# Patient Record
Sex: FEMALE | Race: WHITE | NOT HISPANIC OR LATINO | Employment: OTHER
[De-identification: names, ages, dates, MRNs, and addresses within clinical notes are randomized per-mention and may not be internally consistent; named-entity substitution may affect disease eponyms.]

---

## 2017-07-31 ENCOUNTER — HOSPITAL (OUTPATIENT)
Dept: OTHER | Age: 72
End: 2017-07-31

## 2017-07-31 ENCOUNTER — IMAGING SERVICES (OUTPATIENT)
Dept: OTHER | Age: 72
End: 2017-07-31

## 2017-08-01 ENCOUNTER — HOSPITAL (OUTPATIENT)
Dept: OTHER | Age: 72
End: 2017-08-01

## 2017-08-01 ENCOUNTER — IMAGING SERVICES (OUTPATIENT)
Dept: OTHER | Age: 72
End: 2017-08-01

## 2017-08-16 ENCOUNTER — HOSPITAL (OUTPATIENT)
Dept: OTHER | Age: 72
End: 2017-08-16

## 2017-11-25 ENCOUNTER — HOSPITAL (OUTPATIENT)
Dept: OTHER | Age: 72
End: 2017-11-25

## 2017-11-25 ENCOUNTER — IMAGING SERVICES (OUTPATIENT)
Dept: OTHER | Age: 72
End: 2017-11-25

## 2017-11-28 ENCOUNTER — LAB SERVICES (OUTPATIENT)
Dept: OTHER | Age: 72
End: 2017-11-28

## 2017-11-28 ENCOUNTER — CHARTING TRANS (OUTPATIENT)
Dept: OTHER | Age: 72
End: 2017-11-28

## 2017-11-28 LAB
APPEARANCE: CLEAR
BILIRUBIN: NEGATIVE
COLOR: YELLOW
GLUCOSE U: NEGATIVE
KETONES: NEGATIVE
LEUKOCYTE ESTERASE: NEGATIVE
NITRITE: NEGATIVE
OCCULT BLOOD: NEGATIVE
PH: 7
PROTEIN: NEGATIVE
URINE SPEC GRAVITY: 1.01
UROBILINOGEN: 0.2

## 2017-12-23 ENCOUNTER — IMAGING SERVICES (OUTPATIENT)
Dept: OTHER | Age: 72
End: 2017-12-23

## 2017-12-23 ENCOUNTER — HOSPITAL (OUTPATIENT)
Dept: OTHER | Age: 72
End: 2017-12-23

## 2018-07-16 ENCOUNTER — CHARTING TRANS (OUTPATIENT)
Dept: OTHER | Age: 73
End: 2018-07-16

## 2018-07-17 ENCOUNTER — MYAURORA ACCOUNT LINK (OUTPATIENT)
Dept: OTHER | Age: 73
End: 2018-07-17

## 2018-07-17 ENCOUNTER — CHARTING TRANS (OUTPATIENT)
Dept: OTHER | Age: 73
End: 2018-07-17

## 2018-08-22 ENCOUNTER — CHARTING TRANS (OUTPATIENT)
Dept: OTHER | Age: 73
End: 2018-08-22

## 2018-09-25 ENCOUNTER — IMAGING SERVICES (OUTPATIENT)
Dept: OTHER | Age: 73
End: 2018-09-25

## 2018-09-25 ENCOUNTER — HOSPITAL (OUTPATIENT)
Dept: OTHER | Age: 73
End: 2018-09-25

## 2018-09-27 ENCOUNTER — MYAURORA ACCOUNT LINK (OUTPATIENT)
Dept: OTHER | Age: 73
End: 2018-09-27

## 2018-09-27 ENCOUNTER — CHARTING TRANS (OUTPATIENT)
Dept: OTHER | Age: 73
End: 2018-09-27

## 2018-10-31 VITALS
HEART RATE: 80 BPM | BODY MASS INDEX: 29.64 KG/M2 | HEIGHT: 61 IN | WEIGHT: 156.99 LBS | TEMPERATURE: 98.1 F | SYSTOLIC BLOOD PRESSURE: 160 MMHG | DIASTOLIC BLOOD PRESSURE: 82 MMHG

## 2018-11-02 VITALS
DIASTOLIC BLOOD PRESSURE: 72 MMHG | WEIGHT: 163.38 LBS | SYSTOLIC BLOOD PRESSURE: 118 MMHG | HEIGHT: 61 IN | HEART RATE: 76 BPM | BODY MASS INDEX: 30.85 KG/M2

## 2018-12-12 ENCOUNTER — HOSPITAL (OUTPATIENT)
Dept: OTHER | Age: 73
End: 2018-12-12

## 2019-01-01 ENCOUNTER — EXTERNAL RECORD (OUTPATIENT)
Dept: HEALTH INFORMATION MANAGEMENT | Facility: OTHER | Age: 74
End: 2019-01-01

## 2019-01-10 ENCOUNTER — HOSPITAL (OUTPATIENT)
Dept: OTHER | Age: 74
End: 2019-01-10

## 2019-01-14 ENCOUNTER — HOSPITAL (OUTPATIENT)
Dept: OTHER | Age: 74
End: 2019-01-14

## 2020-01-10 ENCOUNTER — OFFICE VISIT (OUTPATIENT)
Dept: OBGYN | Age: 75
End: 2020-01-10

## 2020-01-10 VITALS
DIASTOLIC BLOOD PRESSURE: 74 MMHG | WEIGHT: 160 LBS | SYSTOLIC BLOOD PRESSURE: 128 MMHG | HEIGHT: 61 IN | BODY MASS INDEX: 30.21 KG/M2

## 2020-01-10 DIAGNOSIS — B37.2 CANDIDAL INTERTRIGO: ICD-10-CM

## 2020-01-10 DIAGNOSIS — M81.6 LOCALIZED OSTEOPOROSIS WITHOUT CURRENT PATHOLOGICAL FRACTURE: ICD-10-CM

## 2020-01-10 DIAGNOSIS — Z79.83 LONG TERM CURRENT USE OF BISPHOSPHONATES: ICD-10-CM

## 2020-01-10 DIAGNOSIS — Z12.31 ENCOUNTER FOR SCREENING MAMMOGRAM FOR MALIGNANT NEOPLASM OF BREAST: Primary | ICD-10-CM

## 2020-01-10 DIAGNOSIS — N95.9 MENOPAUSAL AND POSTMENOPAUSAL DISORDER: ICD-10-CM

## 2020-01-10 PROCEDURE — G0101 CA SCREEN;PELVIC/BREAST EXAM: HCPCS | Performed by: OBSTETRICS & GYNECOLOGY

## 2020-01-10 RX ORDER — CLOTRIMAZOLE AND BETAMETHASONE DIPROPIONATE 10; .64 MG/G; MG/G
CREAM TOPICAL 2 TIMES DAILY
Qty: 1 TUBE | Refills: 3 | Status: SHIPPED | OUTPATIENT
Start: 2020-01-10 | End: 2020-01-24

## 2020-01-10 ASSESSMENT — ENCOUNTER SYMPTOMS
APPETITE CHANGE: 0
ACTIVITY CHANGE: 1
HEADACHES: 1
UNEXPECTED WEIGHT CHANGE: 1
BACK PAIN: 1
FATIGUE: 0
RESPIRATORY NEGATIVE: 1

## 2020-01-10 ASSESSMENT — PATIENT HEALTH QUESTIONNAIRE - PHQ9
2. FEELING DOWN, DEPRESSED OR HOPELESS: NOT AT ALL
SUM OF ALL RESPONSES TO PHQ9 QUESTIONS 1 AND 2: 0
1. LITTLE INTEREST OR PLEASURE IN DOING THINGS: NOT AT ALL
SUM OF ALL RESPONSES TO PHQ9 QUESTIONS 1 AND 2: 0

## 2020-06-05 ENCOUNTER — EXTERNAL RECORD (OUTPATIENT)
Dept: HEALTH INFORMATION MANAGEMENT | Facility: OTHER | Age: 75
End: 2020-06-05

## 2021-01-13 DIAGNOSIS — Z12.31 VISIT FOR SCREENING MAMMOGRAM: Primary | ICD-10-CM

## 2021-02-10 ENCOUNTER — IMAGING SERVICES (OUTPATIENT)
Dept: MAMMOGRAPHY | Age: 76
End: 2021-02-10
Attending: FAMILY MEDICINE

## 2021-02-10 DIAGNOSIS — Z12.31 VISIT FOR SCREENING MAMMOGRAM: ICD-10-CM

## 2021-02-10 PROCEDURE — 77067 SCR MAMMO BI INCL CAD: CPT | Performed by: RADIOLOGY

## 2021-02-10 PROCEDURE — 77063 BREAST TOMOSYNTHESIS BI: CPT | Performed by: RADIOLOGY

## 2021-03-27 ENCOUNTER — EXTERNAL RECORD (OUTPATIENT)
Dept: HEALTH INFORMATION MANAGEMENT | Facility: OTHER | Age: 76
End: 2021-03-27

## 2021-10-26 ENCOUNTER — IMAGING SERVICES (OUTPATIENT)
Dept: GENERAL RADIOLOGY | Age: 76
End: 2021-10-26
Attending: FAMILY MEDICINE

## 2021-10-26 DIAGNOSIS — K59.03 DRUG INDUCED CONSTIPATION: ICD-10-CM

## 2021-10-26 DIAGNOSIS — R10.30 LOWER ABDOMINAL PAIN: ICD-10-CM

## 2021-10-26 PROCEDURE — 74019 RADEX ABDOMEN 2 VIEWS: CPT | Performed by: RADIOLOGY

## 2021-11-11 ENCOUNTER — EXTERNAL RECORD (OUTPATIENT)
Dept: HEALTH INFORMATION MANAGEMENT | Facility: OTHER | Age: 76
End: 2021-11-11

## 2022-01-14 ENCOUNTER — OFFICE VISIT (OUTPATIENT)
Dept: OBGYN | Age: 77
End: 2022-01-14

## 2022-01-14 VITALS
SYSTOLIC BLOOD PRESSURE: 147 MMHG | BODY MASS INDEX: 26.84 KG/M2 | DIASTOLIC BLOOD PRESSURE: 80 MMHG | HEART RATE: 90 BPM | HEIGHT: 65 IN | WEIGHT: 161.1 LBS

## 2022-01-14 DIAGNOSIS — Z12.31 ENCOUNTER FOR SCREENING MAMMOGRAM FOR MALIGNANT NEOPLASM OF BREAST: ICD-10-CM

## 2022-01-14 DIAGNOSIS — R39.11 HESITANCY OF MICTURITION: ICD-10-CM

## 2022-01-14 DIAGNOSIS — R14.0 ABDOMINAL BLOATING: Primary | ICD-10-CM

## 2022-01-14 PROCEDURE — 87077 CULTURE AEROBIC IDENTIFY: CPT | Performed by: CLINICAL MEDICAL LABORATORY

## 2022-01-14 PROCEDURE — G0101 CA SCREEN;PELVIC/BREAST EXAM: HCPCS | Performed by: OBSTETRICS & GYNECOLOGY

## 2022-01-14 PROCEDURE — 87086 URINE CULTURE/COLONY COUNT: CPT | Performed by: CLINICAL MEDICAL LABORATORY

## 2022-01-14 PROCEDURE — 87186 SC STD MICRODIL/AGAR DIL: CPT | Performed by: CLINICAL MEDICAL LABORATORY

## 2022-01-14 RX ORDER — ICOSAPENT ETHYL 1000 MG/1
CAPSULE ORAL
COMMUNITY
Start: 2021-11-03

## 2022-01-14 RX ORDER — ATORVASTATIN CALCIUM 80 MG/1
80 TABLET, FILM COATED ORAL DAILY
COMMUNITY
Start: 2021-12-21

## 2022-01-14 RX ORDER — ALENDRONATE SODIUM 70 MG/1
TABLET ORAL
COMMUNITY
Start: 2021-12-07

## 2022-01-14 RX ORDER — OXYCODONE AND ACETAMINOPHEN 7.5; 325 MG/1; MG/1
1 TABLET ORAL EVERY 6 HOURS PRN
COMMUNITY
Start: 2021-12-27

## 2022-01-14 RX ORDER — CALCIUM CARBONATE 300MG(750)
TABLET,CHEWABLE ORAL
COMMUNITY

## 2022-01-14 RX ORDER — NALOXEGOL OXALATE 12.5 MG/1
TABLET, FILM COATED ORAL
COMMUNITY
Start: 2021-10-21

## 2022-01-14 RX ORDER — GABAPENTIN 400 MG/1
CAPSULE ORAL
COMMUNITY
Start: 2021-12-23

## 2022-01-14 RX ORDER — LISINOPRIL AND HYDROCHLOROTHIAZIDE 20; 12.5 MG/1; MG/1
1 TABLET ORAL 2 TIMES DAILY
COMMUNITY
Start: 2021-12-07

## 2022-01-14 RX ORDER — LEVOTHYROXINE SODIUM 0.05 MG/1
50 TABLET ORAL DAILY
COMMUNITY
Start: 2021-10-26

## 2022-01-14 RX ORDER — PSEUDOEPHEDRINE HCL 30 MG
100 TABLET ORAL 2 TIMES DAILY
COMMUNITY
Start: 2021-08-04

## 2022-01-14 RX ORDER — NYSTATIN 100000 U/G
CREAM TOPICAL
COMMUNITY
Start: 2021-11-02

## 2022-01-14 RX ORDER — LYSINE HCL 500 MG
TABLET ORAL
COMMUNITY

## 2022-01-14 RX ORDER — YEAST,DRIED (S. CEREVISIAE)
POWDER (GRAM) ORAL
COMMUNITY

## 2022-01-14 RX ORDER — OMEPRAZOLE 20 MG/1
CAPSULE, DELAYED RELEASE ORAL
COMMUNITY
Start: 2021-10-13

## 2022-01-14 RX ORDER — METHOCARBAMOL 500 MG/1
2 TABLET, FILM COATED ORAL 3 TIMES DAILY
COMMUNITY
Start: 2021-08-04

## 2022-01-14 ASSESSMENT — ENCOUNTER SYMPTOMS
DIARRHEA: 0
ACTIVITY CHANGE: 1
UNEXPECTED WEIGHT CHANGE: 0
BLOOD IN STOOL: 0
FEVER: 0
APPETITE CHANGE: 0
ABDOMINAL PAIN: 1
NAUSEA: 0
HEADACHES: 0
ABDOMINAL DISTENTION: 1
CONSTIPATION: 1
RECTAL PAIN: 0
VOMITING: 0
BACK PAIN: 1
FATIGUE: 0

## 2022-01-14 ASSESSMENT — PATIENT HEALTH QUESTIONNAIRE - PHQ9
SUM OF ALL RESPONSES TO PHQ9 QUESTIONS 1 AND 2: 0
SUM OF ALL RESPONSES TO PHQ9 QUESTIONS 1 AND 2: 0
1. LITTLE INTEREST OR PLEASURE IN DOING THINGS: NOT AT ALL
CLINICAL INTERPRETATION OF PHQ2 SCORE: NO FURTHER SCREENING NEEDED
2. FEELING DOWN, DEPRESSED OR HOPELESS: NOT AT ALL

## 2022-01-16 LAB — BACTERIA UR CULT: ABNORMAL

## 2022-01-17 ENCOUNTER — TELEPHONE (OUTPATIENT)
Dept: OBGYN | Age: 77
End: 2022-01-17

## 2022-01-17 DIAGNOSIS — N30.00 ACUTE CYSTITIS WITHOUT HEMATURIA: Primary | ICD-10-CM

## 2022-01-17 RX ORDER — NITROFURANTOIN 25; 75 MG/1; MG/1
100 CAPSULE ORAL 2 TIMES DAILY
Qty: 10 CAPSULE | Refills: 0 | Status: SHIPPED | OUTPATIENT
Start: 2022-01-17

## 2022-01-31 ENCOUNTER — IMAGING SERVICES (OUTPATIENT)
Dept: CT IMAGING | Age: 77
End: 2022-01-31
Attending: FAMILY MEDICINE

## 2022-01-31 DIAGNOSIS — K59.03 DRUG-INDUCED CONSTIPATION: ICD-10-CM

## 2022-01-31 PROCEDURE — G1004 CDSM NDSC: HCPCS | Performed by: RADIOLOGY

## 2022-01-31 PROCEDURE — 74160 CT ABDOMEN W/CONTRAST: CPT | Performed by: RADIOLOGY

## 2022-02-16 ENCOUNTER — IMAGING SERVICES (OUTPATIENT)
Dept: MAMMOGRAPHY | Age: 77
End: 2022-02-16
Attending: OBSTETRICS & GYNECOLOGY

## 2022-02-16 DIAGNOSIS — Z12.31 ENCOUNTER FOR SCREENING MAMMOGRAM FOR MALIGNANT NEOPLASM OF BREAST: ICD-10-CM

## 2022-02-16 PROCEDURE — 77067 SCR MAMMO BI INCL CAD: CPT | Performed by: RADIOLOGY

## 2022-02-16 PROCEDURE — 77063 BREAST TOMOSYNTHESIS BI: CPT | Performed by: RADIOLOGY

## 2022-05-10 ENCOUNTER — IMAGING SERVICES (OUTPATIENT)
Dept: ULTRASOUND IMAGING | Age: 77
End: 2022-05-10
Attending: OBSTETRICS & GYNECOLOGY

## 2022-05-10 DIAGNOSIS — R14.0 ABDOMINAL BLOATING: ICD-10-CM

## 2022-05-10 PROCEDURE — 76856 US EXAM PELVIC COMPLETE: CPT | Performed by: RADIOLOGY

## 2022-05-10 PROCEDURE — 76830 TRANSVAGINAL US NON-OB: CPT | Performed by: RADIOLOGY

## 2022-09-30 ENCOUNTER — IMAGING SERVICES (OUTPATIENT)
Dept: BONE DENSITY | Age: 77
End: 2022-09-30
Attending: INTERNAL MEDICINE

## 2022-09-30 DIAGNOSIS — Z78.0 POST-MENOPAUSAL: ICD-10-CM

## 2022-09-30 DIAGNOSIS — M85.80 OSTEOPENIA: ICD-10-CM

## 2022-09-30 PROCEDURE — G1004 CDSM NDSC: HCPCS | Performed by: RADIOLOGY

## 2022-09-30 PROCEDURE — 77080 DXA BONE DENSITY AXIAL: CPT | Performed by: RADIOLOGY

## 2023-02-01 ENCOUNTER — OFFICE VISIT (OUTPATIENT)
Dept: OBGYN | Age: 78
End: 2023-02-01

## 2023-02-01 VITALS
WEIGHT: 149.91 LBS | OXYGEN SATURATION: 97 % | HEART RATE: 75 BPM | RESPIRATION RATE: 17 BRPM | SYSTOLIC BLOOD PRESSURE: 138 MMHG | BODY MASS INDEX: 24.95 KG/M2 | DIASTOLIC BLOOD PRESSURE: 83 MMHG

## 2023-02-01 DIAGNOSIS — N64.4 BREAST PAIN, RIGHT: Primary | ICD-10-CM

## 2023-02-01 PROCEDURE — 99203 OFFICE O/P NEW LOW 30 MIN: CPT | Performed by: OBSTETRICS & GYNECOLOGY

## 2023-02-01 ASSESSMENT — PAIN SCALES - GENERAL: PAINLEVEL: 0

## 2023-02-02 ENCOUNTER — APPOINTMENT (OUTPATIENT)
Dept: URBAN - METROPOLITAN AREA CLINIC 317 | Age: 78
Setting detail: DERMATOLOGY
End: 2023-02-02

## 2023-02-02 DIAGNOSIS — L81.4 OTHER MELANIN HYPERPIGMENTATION: ICD-10-CM

## 2023-02-02 DIAGNOSIS — L82.1 OTHER SEBORRHEIC KERATOSIS: ICD-10-CM

## 2023-02-02 DIAGNOSIS — F42.4 EXCORIATION (SKIN-PICKING) DISORDER: ICD-10-CM

## 2023-02-02 DIAGNOSIS — D485 NEOPLASM OF UNCERTAIN BEHAVIOR OF SKIN: ICD-10-CM

## 2023-02-02 DIAGNOSIS — D18.0 HEMANGIOMA: ICD-10-CM

## 2023-02-02 PROBLEM — D18.01 HEMANGIOMA OF SKIN AND SUBCUTANEOUS TISSUE: Status: ACTIVE | Noted: 2023-02-02

## 2023-02-02 PROBLEM — S00.80XA UNSPECIFIED SUPERFICIAL INJURY OF OTHER PART OF HEAD, INITIAL ENCOUNTER: Status: ACTIVE | Noted: 2023-02-02

## 2023-02-02 PROBLEM — D48.5 NEOPLASM OF UNCERTAIN BEHAVIOR OF SKIN: Status: ACTIVE | Noted: 2023-02-02

## 2023-02-02 PROCEDURE — 99213 OFFICE O/P EST LOW 20 MIN: CPT

## 2023-02-02 PROCEDURE — OTHER COUNSELING: OTHER

## 2023-02-02 PROCEDURE — OTHER MIPS QUALITY: OTHER

## 2023-02-02 PROCEDURE — OTHER DEFER: OTHER

## 2023-02-02 PROCEDURE — OTHER DIAGNOSIS COMMENT: OTHER

## 2023-02-02 ASSESSMENT — LOCATION DETAILED DESCRIPTION DERM
LOCATION DETAILED: EPIGASTRIC SKIN
LOCATION DETAILED: LEFT SUPERIOR FOREHEAD
LOCATION DETAILED: INFERIOR THORACIC SPINE

## 2023-02-02 ASSESSMENT — LOCATION SIMPLE DESCRIPTION DERM
LOCATION SIMPLE: UPPER BACK
LOCATION SIMPLE: LEFT FOREHEAD
LOCATION SIMPLE: ABDOMEN

## 2023-02-02 ASSESSMENT — LOCATION ZONE DERM
LOCATION ZONE: FACE
LOCATION ZONE: TRUNK

## 2023-02-02 NOTE — PROCEDURE: DIAGNOSIS COMMENT
Render Risk Assessment In Note?: no
Comment: Avoid manipulation. Monitor. Reassess if symptomatic or persistent in 4-6 weeks
Detail Level: Zone
Comment: Lesion is excoriated due to manipulation. Avoid manipulation. Reassess in 4-6 weeks. Biopsy with Dr. Rowland if suspicious - pt requests Plastics. 3 mm excoriation on erythematous base

## 2023-02-02 NOTE — HPI: TOTAL BODY SKIN EXAM (PATIENT REPORTED)
Do You Have Any Concerning Skin Lesions Today?  If No, You Do Not Have To Fill Out The Remainder Of The Questions.: yes
Where Is Your Skin Lesion(S) Of Concern Located?: Right nostril

## 2023-03-06 ENCOUNTER — APPOINTMENT (OUTPATIENT)
Dept: URBAN - METROPOLITAN AREA CLINIC 249 | Age: 78
Setting detail: DERMATOLOGY
End: 2023-03-06

## 2023-03-06 DIAGNOSIS — D49.2 NEOPLASM OF UNSPECIFIED BEHAVIOR OF BONE, SOFT TISSUE, AND SKIN: ICD-10-CM

## 2023-03-06 PROCEDURE — OTHER SHAVE REMOVAL: OTHER

## 2023-03-06 PROCEDURE — 11311 SHAVE SKIN LESION 0.6-1.0 CM: CPT

## 2023-03-06 ASSESSMENT — LOCATION ZONE DERM: LOCATION ZONE: NOSE

## 2023-03-06 ASSESSMENT — LOCATION SIMPLE DESCRIPTION DERM: LOCATION SIMPLE: RIGHT NOSE

## 2023-03-06 ASSESSMENT — LOCATION DETAILED DESCRIPTION DERM: LOCATION DETAILED: RIGHT NASAL ALA

## 2023-03-06 NOTE — PROCEDURE: SHAVE REMOVAL
Hide Shave Removal Depth?: No
Was A Bandage Applied: Yes
X Size Of Lesion In Cm (Optional): 0
Biopsy Method: Dermablade
Medical Necessity Clause: This procedure was medically necessary because the lesion that was treated was:
Hemostasis: Drysol
Size Of Lesion In Cm (Required): 0.8
Anesthesia Type: 1% lidocaine with epinephrine
Billing Type: Third-Party Bill
Consent was obtained from the patient. The risks and benefits to therapy were discussed in detail. Specifically, the risks of infection, scarring, bleeding, prolonged wound healing, incomplete removal, allergy to anesthesia, nerve injury and recurrence were addressed. Prior to the procedure, the treatment site was clearly identified and confirmed by the patient. All components of Universal Protocol/PAUSE Rule completed.
Wound Care: Petrolatum
Depth Of Shave: dermis
Medical Necessity Information: It is in your best interest to select a reason for this procedure from the list below. All of these items fulfill various CMS LCD requirements except the new and changing color options.
Notification Instructions: Patient will be notified of pathology results. However, patient instructed to call the office if not contacted within 2 weeks.
Detail Level: Detailed
Post-Care Instructions: I reviewed with the patient in detail post-care instructions. Patient is to keep the biopsy site dry overnight, and then apply bacitracin twice daily until healed. Patient may apply hydrogen peroxide soaks to remove any crusting.

## 2023-03-15 ENCOUNTER — IMAGING SERVICES (OUTPATIENT)
Dept: ULTRASOUND IMAGING | Age: 78
End: 2023-03-15
Attending: OBSTETRICS & GYNECOLOGY

## 2023-03-15 ENCOUNTER — IMAGING SERVICES (OUTPATIENT)
Dept: MAMMOGRAPHY | Age: 78
End: 2023-03-15
Attending: OBSTETRICS & GYNECOLOGY

## 2023-03-15 DIAGNOSIS — N64.4 BREAST PAIN, RIGHT: ICD-10-CM

## 2023-03-15 PROCEDURE — 76642 ULTRASOUND BREAST LIMITED: CPT | Performed by: RADIOLOGY

## 2023-03-15 PROCEDURE — 77066 DX MAMMO INCL CAD BI: CPT | Performed by: RADIOLOGY

## 2023-03-28 ENCOUNTER — APPOINTMENT (OUTPATIENT)
Dept: URBAN - METROPOLITAN AREA CLINIC 247 | Age: 78
Setting detail: DERMATOLOGY
End: 2023-03-28

## 2023-03-28 ENCOUNTER — APPOINTMENT (OUTPATIENT)
Dept: URBAN - METROPOLITAN AREA CLINIC 247 | Age: 78
Setting detail: DERMATOLOGY
End: 2023-03-31

## 2023-03-28 PROBLEM — C44.311 BASAL CELL CARCINOMA OF SKIN OF NOSE: Status: ACTIVE | Noted: 2023-03-28

## 2023-03-28 PROCEDURE — A4550 SURGICAL TRAYS: HCPCS

## 2023-03-28 PROCEDURE — OTHER REPAIR NOTE: OTHER

## 2023-03-28 PROCEDURE — 17311 MOHS 1 STAGE H/N/HF/G: CPT

## 2023-03-28 PROCEDURE — OTHER MOHS SURGERY: OTHER

## 2023-03-28 PROCEDURE — 17312 MOHS ADDL STAGE: CPT

## 2023-03-28 PROCEDURE — 14060 TIS TRNFR E/N/E/L 10 SQ CM/<: CPT

## 2023-03-28 NOTE — PROCEDURE: REPAIR NOTE
[email or web address] Dorsal Nasal Flap Text: The defect edges were debeveled with a #15 scalpel blade.  Given the location of the defect and the proximity to free margins a dorsal nasal flap was deemed most appropriate.  Using a sterile surgical marker, an appropriate dorsal nasal flap was drawn around the defect.    The area thus outlined was incised deep to adipose tissue with a #15 scalpel blade.  The skin margins were undermined to an appropriate distance in all directions utilizing iris scissors.

## 2023-03-28 NOTE — PROCEDURE: REPAIR NOTE
other Information: Selecting Yes will display possible errors in your note based on the variables you have selected. This validation is only offered as a suggestion for you. PLEASE NOTE THAT THE VALIDATION TEXT WILL BE REMOVED WHEN YOU FINALIZE YOUR NOTE. IF YOU WANT TO FAX A PRELIMINARY NOTE YOU WILL NEED TO TOGGLE THIS TO 'NO' IF YOU DO NOT WANT IT IN YOUR FAXED NOTE.

## 2023-04-04 ENCOUNTER — APPOINTMENT (OUTPATIENT)
Dept: URBAN - METROPOLITAN AREA CLINIC 247 | Age: 78
Setting detail: DERMATOLOGY
End: 2023-04-04

## 2023-04-04 DIAGNOSIS — Z48.02 ENCOUNTER FOR REMOVAL OF SUTURES: ICD-10-CM

## 2023-04-04 PROCEDURE — 99024 POSTOP FOLLOW-UP VISIT: CPT

## 2023-04-04 PROCEDURE — OTHER SUTURE REMOVAL: OTHER

## 2023-04-04 PROCEDURE — OTHER SUTURE REMOVAL (GLOBAL PERIOD): OTHER

## 2023-04-04 ASSESSMENT — LOCATION SIMPLE DESCRIPTION DERM
LOCATION SIMPLE: RIGHT NOSE
LOCATION SIMPLE: RIGHT NOSE

## 2023-04-04 ASSESSMENT — LOCATION DETAILED DESCRIPTION DERM
LOCATION DETAILED: RIGHT NASAL ALAR GROOVE
LOCATION DETAILED: RIGHT NASAL SIDEWALL

## 2023-04-04 ASSESSMENT — LOCATION ZONE DERM
LOCATION ZONE: NOSE
LOCATION ZONE: NOSE

## 2023-04-04 NOTE — PROCEDURE: SUTURE REMOVAL (GLOBAL PERIOD)
Add 86811 Cpt? (Important Note: In 2017 The Use Of 90373 Is Being Tracked By Cms To Determine Future Global Period Reimbursement For Global Periods): yes
Add 1585x Cpt? (Do Not Bill If You Billed For The Procedure Placing The Sutures. This Is An Add-On Code That Must Be Billed With An E/M Visit Code): No
Detail Level: Detailed
Body Location Override (Optional - Billing Will Still Be Based On Selected Body Map Location If Applicable): right nasal ala

## 2023-06-15 ENCOUNTER — TELEPHONE (OUTPATIENT)
Dept: OBGYN | Age: 78
End: 2023-06-15

## 2023-06-21 ENCOUNTER — OFFICE VISIT (OUTPATIENT)
Dept: OBGYN | Age: 78
End: 2023-06-21

## 2023-06-21 VITALS
BODY MASS INDEX: 29.31 KG/M2 | WEIGHT: 176.15 LBS | OXYGEN SATURATION: 96 % | DIASTOLIC BLOOD PRESSURE: 81 MMHG | HEART RATE: 74 BPM | SYSTOLIC BLOOD PRESSURE: 147 MMHG | RESPIRATION RATE: 17 BRPM

## 2023-06-21 DIAGNOSIS — R93.89 THICKENED ENDOMETRIUM: Primary | ICD-10-CM

## 2023-06-21 PROCEDURE — 99213 OFFICE O/P EST LOW 20 MIN: CPT | Performed by: OBSTETRICS & GYNECOLOGY

## 2023-06-21 PROCEDURE — 88305 TISSUE EXAM BY PATHOLOGIST: CPT | Performed by: CLINICAL MEDICAL LABORATORY

## 2023-06-21 PROCEDURE — 58100 BIOPSY OF UTERUS LINING: CPT | Performed by: OBSTETRICS & GYNECOLOGY

## 2023-06-21 ASSESSMENT — ENCOUNTER SYMPTOMS
APPETITE CHANGE: 0
DIAPHORESIS: 0
ACTIVITY CHANGE: 1
BACK PAIN: 1
FEVER: 0
NERVOUS/ANXIOUS: 1
CHILLS: 0
ABDOMINAL PAIN: 1
VOMITING: 0
DIARRHEA: 0
UNEXPECTED WEIGHT CHANGE: 0
CONSTIPATION: 1

## 2023-06-21 ASSESSMENT — PAIN SCALES - GENERAL: PAINLEVEL: 0

## 2023-06-26 LAB
ASR DISCLAIMER: NORMAL
CASE RPRT: NORMAL
CLINICAL INFO: NORMAL
PATH REPORT.FINAL DX SPEC: NORMAL
PATH REPORT.GROSS SPEC: NORMAL

## 2023-06-29 ENCOUNTER — TELEPHONE (OUTPATIENT)
Dept: OBGYN | Age: 78
End: 2023-06-29

## 2023-07-11 ENCOUNTER — APPOINTMENT (OUTPATIENT)
Dept: URBAN - METROPOLITAN AREA CLINIC 247 | Age: 78
Setting detail: DERMATOLOGY
End: 2023-07-11

## 2023-10-26 ENCOUNTER — CLINICAL ABSTRACT (OUTPATIENT)
Dept: OTHER | Age: 78
End: 2023-10-26

## 2023-10-26 VITALS — SYSTOLIC BLOOD PRESSURE: 136 MMHG | DIASTOLIC BLOOD PRESSURE: 79 MMHG

## 2024-04-25 ENCOUNTER — APPOINTMENT (OUTPATIENT)
Dept: OBGYN | Age: 79
End: 2024-04-25

## 2024-04-25 VITALS
DIASTOLIC BLOOD PRESSURE: 83 MMHG | RESPIRATION RATE: 18 BRPM | TEMPERATURE: 97.5 F | HEART RATE: 85 BPM | SYSTOLIC BLOOD PRESSURE: 150 MMHG

## 2024-04-25 DIAGNOSIS — Z01.411 ENCOUNTER FOR GYNECOLOGICAL EXAMINATION WITH ABNORMAL FINDING: Primary | ICD-10-CM

## 2024-04-25 DIAGNOSIS — R10.32 LEFT LOWER QUADRANT PAIN: ICD-10-CM

## 2024-04-25 DIAGNOSIS — Z12.31 ENCOUNTER FOR SCREENING MAMMOGRAM FOR MALIGNANT NEOPLASM OF BREAST: ICD-10-CM

## 2024-04-25 ASSESSMENT — ENCOUNTER SYMPTOMS
NERVOUS/ANXIOUS: 1
ABDOMINAL DISTENTION: 1
BLOOD IN STOOL: 1
COUGH: 0
SHORTNESS OF BREATH: 0
ABDOMINAL PAIN: 1

## 2024-05-08 ENCOUNTER — APPOINTMENT (OUTPATIENT)
Dept: MAMMOGRAPHY | Age: 79
End: 2024-05-08
Attending: OBSTETRICS & GYNECOLOGY

## 2024-05-08 DIAGNOSIS — Z12.31 ENCOUNTER FOR SCREENING MAMMOGRAM FOR MALIGNANT NEOPLASM OF BREAST: ICD-10-CM

## 2024-05-08 PROCEDURE — 77063 BREAST TOMOSYNTHESIS BI: CPT | Performed by: STUDENT IN AN ORGANIZED HEALTH CARE EDUCATION/TRAINING PROGRAM

## 2024-05-08 PROCEDURE — 77067 SCR MAMMO BI INCL CAD: CPT | Performed by: STUDENT IN AN ORGANIZED HEALTH CARE EDUCATION/TRAINING PROGRAM

## 2024-05-20 ENCOUNTER — APPOINTMENT (OUTPATIENT)
Dept: ULTRASOUND IMAGING | Age: 79
End: 2024-05-20
Attending: OBSTETRICS & GYNECOLOGY

## 2024-05-20 DIAGNOSIS — R10.32 LEFT LOWER QUADRANT PAIN: ICD-10-CM

## 2024-05-20 PROCEDURE — 76856 US EXAM PELVIC COMPLETE: CPT | Performed by: RADIOLOGY

## 2024-05-20 PROCEDURE — 76830 TRANSVAGINAL US NON-OB: CPT | Performed by: RADIOLOGY

## 2024-11-08 NOTE — PROCEDURE: MOHS SURGERY

## 2024-12-11 DIAGNOSIS — M81.0 AGE-RELATED OSTEOPOROSIS WITHOUT CURRENT PATHOLOGICAL FRACTURE: Primary | ICD-10-CM

## 2024-12-31 ENCOUNTER — APPOINTMENT (OUTPATIENT)
Dept: BONE DENSITY | Age: 79
End: 2024-12-31
Attending: INTERNAL MEDICINE

## 2024-12-31 DIAGNOSIS — M81.0 AGE-RELATED OSTEOPOROSIS WITHOUT CURRENT PATHOLOGICAL FRACTURE: ICD-10-CM

## 2024-12-31 PROCEDURE — 77080 DXA BONE DENSITY AXIAL: CPT | Performed by: NUCLEAR MEDICINE

## 2025-01-01 LAB
DEXA FRACTURE RISK HIP: 18.5
DEXA FRACTURE RISK MAJOR: 28.9
DEXA LT FEMNECK TSCORE: -2
DEXA LT FEMNECK ZSCORE: 0.1
DEXA LT FOREARM T-SCORE: -2.4
DEXA LT FOREARM Z-SCORE: 0.3
DEXA LT TOTFEM TSCORE: -1.3
DEXA LT TOTFEM ZSCORE: 0.6
DEXA RT FEMNECK TSCORE: -2
DEXA RT FEMNECK ZSCORE: 0
DEXA RT HIP FRAX: 18.5
DEXA RT MAJOR OSTEO FRAX: 28.9
DEXA RT TOTFEM TSCORE: -1.2
DEXA RT TOTFEM ZSCORE: 0.7

## 2025-01-06 DIAGNOSIS — Z12.31 VISIT FOR SCREENING MAMMOGRAM: Primary | ICD-10-CM

## 2025-03-26 ENCOUNTER — APPOINTMENT (OUTPATIENT)
Dept: URBAN - METROPOLITAN AREA CLINIC 317 | Age: 80
Setting detail: DERMATOLOGY
End: 2025-03-26

## 2025-03-26 DIAGNOSIS — Z71.89 OTHER SPECIFIED COUNSELING: ICD-10-CM

## 2025-03-26 DIAGNOSIS — L81.4 OTHER MELANIN HYPERPIGMENTATION: ICD-10-CM

## 2025-03-26 DIAGNOSIS — L82.1 OTHER SEBORRHEIC KERATOSIS: ICD-10-CM

## 2025-03-26 DIAGNOSIS — D18.0 HEMANGIOMA: ICD-10-CM

## 2025-03-26 PROBLEM — D18.01 HEMANGIOMA OF SKIN AND SUBCUTANEOUS TISSUE: Status: ACTIVE | Noted: 2025-03-26

## 2025-03-26 PROCEDURE — OTHER COUNSELING: OTHER

## 2025-03-26 PROCEDURE — 99213 OFFICE O/P EST LOW 20 MIN: CPT

## 2025-03-26 PROCEDURE — OTHER MIPS QUALITY: OTHER

## 2025-03-26 ASSESSMENT — LOCATION DETAILED DESCRIPTION DERM
LOCATION DETAILED: EPIGASTRIC SKIN
LOCATION DETAILED: PERIUMBILICAL SKIN
LOCATION DETAILED: UPPER STERNUM
LOCATION DETAILED: SUPERIOR LUMBAR SPINE
LOCATION DETAILED: INFERIOR THORACIC SPINE
LOCATION DETAILED: SUPERIOR THORACIC SPINE

## 2025-03-26 ASSESSMENT — LOCATION SIMPLE DESCRIPTION DERM
LOCATION SIMPLE: UPPER BACK
LOCATION SIMPLE: LOWER BACK
LOCATION SIMPLE: ABDOMEN
LOCATION SIMPLE: CHEST

## 2025-03-26 ASSESSMENT — LOCATION ZONE DERM: LOCATION ZONE: TRUNK

## 2025-05-09 ENCOUNTER — APPOINTMENT (OUTPATIENT)
Age: 80
End: 2025-05-09
Attending: FAMILY MEDICINE

## 2025-05-14 ENCOUNTER — HOSPITAL ENCOUNTER (OUTPATIENT)
Age: 80
Discharge: HOME | End: 2025-05-14
Attending: FAMILY MEDICINE

## 2025-05-14 DIAGNOSIS — Z12.31 VISIT FOR SCREENING MAMMOGRAM: ICD-10-CM

## 2025-05-14 PROCEDURE — 77067 SCR MAMMO BI INCL CAD: CPT

## 2025-06-19 DIAGNOSIS — N64.4 BREAST PAIN, LEFT: Primary | ICD-10-CM

## 2025-08-08 ENCOUNTER — HOSPITAL ENCOUNTER (OUTPATIENT)
Age: 80
Discharge: HOME | End: 2025-08-08
Attending: FAMILY MEDICINE

## 2025-08-08 DIAGNOSIS — N64.4 BREAST PAIN, LEFT: ICD-10-CM

## 2025-08-08 PROCEDURE — 77065 DX MAMMO INCL CAD UNI: CPT
